# Patient Record
Sex: FEMALE | Race: BLACK OR AFRICAN AMERICAN | NOT HISPANIC OR LATINO | Employment: OTHER | URBAN - METROPOLITAN AREA
[De-identification: names, ages, dates, MRNs, and addresses within clinical notes are randomized per-mention and may not be internally consistent; named-entity substitution may affect disease eponyms.]

---

## 2018-01-11 ENCOUNTER — ESTABLISHED COMPREHENSIVE EXAM (OUTPATIENT)
Dept: URBAN - METROPOLITAN AREA CLINIC 46 | Facility: CLINIC | Age: 53
End: 2018-01-11

## 2018-01-11 ENCOUNTER — RX CHANGE - NO APPT (OUTPATIENT)
Dept: URBAN - METROPOLITAN AREA CLINIC 46 | Facility: CLINIC | Age: 53
End: 2018-01-11

## 2018-01-11 DIAGNOSIS — H52.7: ICD-10-CM

## 2018-01-11 PROCEDURE — 92014 COMPRE OPH EXAM EST PT 1/>: CPT

## 2018-01-11 PROCEDURE — 92310SDW STANDARD DAILY WEAR

## 2018-01-11 PROCEDURE — 92310-1 LEVEL 1 CONTACT LENS MANAGEMENT

## 2018-01-11 PROCEDURE — 92015 DETERMINE REFRACTIVE STATE: CPT

## 2018-01-11 ASSESSMENT — VISUAL ACUITY
OD_SC: J1+
OS_SC: 20/100
OS_CC: 20/20-2
OD_CC: 20/20-1
OS_SC: J1+
OD_SC: 20/100

## 2018-01-11 ASSESSMENT — TONOMETRY
OD_IOP_MMHG: 16
OS_IOP_MMHG: 16

## 2019-03-21 ENCOUNTER — ESTABLISHED COMPREHENSIVE EXAM (OUTPATIENT)
Dept: URBAN - METROPOLITAN AREA CLINIC 46 | Facility: CLINIC | Age: 54
End: 2019-03-21

## 2019-03-21 DIAGNOSIS — H52.7: ICD-10-CM

## 2019-03-21 DIAGNOSIS — H53.8: ICD-10-CM

## 2019-03-21 PROCEDURE — 92014 COMPRE OPH EXAM EST PT 1/>: CPT

## 2019-03-21 PROCEDURE — 92015 DETERMINE REFRACTIVE STATE: CPT

## 2019-03-21 PROCEDURE — 92310-1 LEVEL 1 CONTACT LENS MANAGEMENT

## 2019-03-21 ASSESSMENT — VISUAL ACUITY
OD_PH: 20/30
OS_SC: 20/70
OS_SC: J1+
OS_CC: 20/20-1
OS_PH: 20/30
OD_CC: 20/20-1
OD_SC: J1+
OD_SC: 20/100

## 2019-03-21 ASSESSMENT — TONOMETRY
OD_IOP_MMHG: 16
OS_IOP_MMHG: 16

## 2020-01-08 ENCOUNTER — EST. PATIENT EMERGENCY (OUTPATIENT)
Dept: URBAN - METROPOLITAN AREA CLINIC 46 | Facility: CLINIC | Age: 55
End: 2020-01-08

## 2020-01-08 DIAGNOSIS — H20.00: ICD-10-CM

## 2020-01-08 DIAGNOSIS — H57.89: ICD-10-CM

## 2020-01-08 PROCEDURE — 92012 INTRM OPH EXAM EST PATIENT: CPT

## 2020-01-08 RX ORDER — PREDNISOLONE ACETATE 10 MG/ML: 1 SUSPENSION/ DROPS OPHTHALMIC

## 2020-01-08 ASSESSMENT — VISUAL ACUITY
OD_SC: 20/100-1
OD_PH: 20/30
OS_CC: 20/20-2 W CL

## 2020-01-08 ASSESSMENT — TONOMETRY
OS_IOP_MMHG: 17
OD_IOP_MMHG: 18

## 2020-01-17 ENCOUNTER — FOLLOW UP (OUTPATIENT)
Dept: URBAN - METROPOLITAN AREA CLINIC 46 | Facility: CLINIC | Age: 55
End: 2020-01-17

## 2020-01-17 DIAGNOSIS — H57.89: ICD-10-CM

## 2020-01-17 DIAGNOSIS — H20.00: ICD-10-CM

## 2020-01-17 PROCEDURE — 92012 INTRM OPH EXAM EST PATIENT: CPT

## 2020-01-17 ASSESSMENT — VISUAL ACUITY
OD_PH: 20/20
OS_CC: 20/20-1 W CL
OD_SC: 20/80

## 2020-01-17 ASSESSMENT — TONOMETRY
OS_IOP_MMHG: 17
OD_IOP_MMHG: 17

## 2020-05-21 ENCOUNTER — ESTABLISHED COMPREHENSIVE EXAM (OUTPATIENT)
Dept: URBAN - METROPOLITAN AREA CLINIC 46 | Facility: CLINIC | Age: 55
End: 2020-05-21

## 2020-05-21 DIAGNOSIS — Z97.3: ICD-10-CM

## 2020-05-21 DIAGNOSIS — H52.7: ICD-10-CM

## 2020-05-21 PROCEDURE — 92310-1 LEVEL 1 CONTACT LENS MANAGEMENT

## 2020-05-21 PROCEDURE — 92014 COMPRE OPH EXAM EST PT 1/>: CPT

## 2020-05-21 PROCEDURE — 92015 DETERMINE REFRACTIVE STATE: CPT

## 2020-05-21 ASSESSMENT — VISUAL ACUITY
OS_CC: 20/25-2
OD_CC: 20/30-1
OS_SC: 20/100-1
OS_SC: J2
OD_SC: 20/100-1
OD_SC: J2

## 2020-05-21 ASSESSMENT — TONOMETRY
OD_IOP_MMHG: 16
OS_IOP_MMHG: 17

## 2022-07-20 ENCOUNTER — COMPREHENSIVE EXAM (OUTPATIENT)
Dept: URBAN - METROPOLITAN AREA CLINIC 46 | Facility: CLINIC | Age: 57
End: 2022-07-20

## 2022-07-20 DIAGNOSIS — H52.7: ICD-10-CM

## 2022-07-20 DIAGNOSIS — H40.013: ICD-10-CM

## 2022-07-20 DIAGNOSIS — Z46.0: ICD-10-CM

## 2022-07-20 DIAGNOSIS — Z97.3: ICD-10-CM

## 2022-07-20 DIAGNOSIS — H53.8: ICD-10-CM

## 2022-07-20 PROCEDURE — 92015 DETERMINE REFRACTIVE STATE: CPT

## 2022-07-20 PROCEDURE — 92310-1 LEVEL 1 CONTACT LENS MANAGEMENT

## 2022-07-20 PROCEDURE — 92014 COMPRE OPH EXAM EST PT 1/>: CPT

## 2022-07-20 ASSESSMENT — VISUAL ACUITY
OS_SC: 20/70+2
OU_SC: J1+
OD_SC: J1
OU_SC: 20/60
OU_CC: 20/50
OS_SC: J1
OD_SC: 20/100+1

## 2022-07-20 ASSESSMENT — TONOMETRY
OD_IOP_MMHG: 15
OS_IOP_MMHG: 15

## 2025-07-08 ENCOUNTER — APPOINTMENT (OUTPATIENT)
Dept: URBAN - METROPOLITAN AREA CLINIC 135 | Facility: CLINIC | Age: 60
Setting detail: DERMATOLOGY
End: 2025-07-08

## 2025-07-08 DIAGNOSIS — R21 RASH AND OTHER NONSPECIFIC SKIN ERUPTION: ICD-10-CM | Status: INADEQUATELY CONTROLLED

## 2025-07-08 PROCEDURE — ? ADDITIONAL NOTES

## 2025-07-08 PROCEDURE — ?

## 2025-07-08 PROCEDURE — ? PRESCRIPTION MEDICATION MANAGEMENT

## 2025-07-08 PROCEDURE — ? PHOTO-DOCUMENTATION

## 2025-07-08 PROCEDURE — ? PRESCRIPTION

## 2025-07-08 PROCEDURE — ? COUNSELING

## 2025-07-08 PROCEDURE — ? ORDER TESTS

## 2025-07-08 RX ORDER — HYDROCORTISONE 25 MG/G
THIN LAYER CREAM TOPICAL BID
Qty: 453.6 | Refills: 1 | Status: ERX | COMMUNITY
Start: 2025-07-08

## 2025-07-08 RX ADMIN — HYDROCORTISONE THIN LAYER: 25 CREAM TOPICAL at 00:00

## 2025-07-08 ASSESSMENT — BSA RASH: BSA RASH: 3

## 2025-07-08 ASSESSMENT — LOCATION ZONE DERM
LOCATION ZONE: FACE
LOCATION ZONE: LEG

## 2025-07-08 ASSESSMENT — LOCATION DETAILED DESCRIPTION DERM
LOCATION DETAILED: LEFT ANTERIOR DISTAL THIGH
LOCATION DETAILED: RIGHT SUPERIOR CENTRAL MALAR CHEEK
LOCATION DETAILED: LEFT LATERAL MALAR CHEEK

## 2025-07-08 ASSESSMENT — LOCATION SIMPLE DESCRIPTION DERM
LOCATION SIMPLE: LEFT CHEEK
LOCATION SIMPLE: LEFT THIGH
LOCATION SIMPLE: RIGHT CHEEK

## 2025-07-08 ASSESSMENT — SEVERITY ASSESSMENT: SEVERITY: MILD

## 2025-07-08 NOTE — PROCEDURE: PRESCRIPTION MEDICATION MANAGEMENT
Detail Level: Simple
Render In Strict Bullet Format?: No
Initiate Treatment: hydrocortisone 2.5 % topical cream BID\\nQuantity: 453.6 g  Days Supply: 30\\nSig: Apply a thin layer to affected areas on face and leg(s) twice daily for 14 days, then discontinue for 7 days. Repeat cycle as necessary.

## 2025-07-08 NOTE — PROCEDURE: ADDITIONAL NOTES
Additional Notes: Discussed biopsy for further diagnosis, patient declined biopsy at this time and would like to treat with cream first and return if sites are not improving with current treatment regimen.
Detail Level: Detailed
Render Risk Assessment In Note?: no

## 2025-07-08 NOTE — PROCEDURE: ORDER TESTS
Performing Laboratory: [id number]
Billing Type: Third-Party Bill
Bill For Surgical Tray: no
Expected Date Of Service: 07/08/2025

## 2025-07-23 ENCOUNTER — NEW PATIENT (OUTPATIENT)
Age: 60
End: 2025-07-23

## 2025-07-23 DIAGNOSIS — H40.013: ICD-10-CM

## 2025-07-23 DIAGNOSIS — H52.7: ICD-10-CM

## 2025-07-23 DIAGNOSIS — H25.813: ICD-10-CM

## 2025-07-23 DIAGNOSIS — H04.123: ICD-10-CM

## 2025-07-23 PROCEDURE — 92015 DETERMINE REFRACTIVE STATE: CPT

## 2025-07-23 PROCEDURE — 92004 COMPRE OPH EXAM NEW PT 1/>: CPT
